# Patient Record
Sex: MALE | Race: WHITE
[De-identification: names, ages, dates, MRNs, and addresses within clinical notes are randomized per-mention and may not be internally consistent; named-entity substitution may affect disease eponyms.]

---

## 2017-01-13 ENCOUNTER — HOSPITAL ENCOUNTER (OUTPATIENT)
Dept: HOSPITAL 80 - FIMAGING | Age: 77
End: 2017-01-13
Attending: PSYCHIATRY & NEUROLOGY
Payer: COMMERCIAL

## 2017-01-13 DIAGNOSIS — M43.06: Primary | ICD-10-CM

## 2017-01-13 DIAGNOSIS — M48.06: ICD-10-CM

## 2017-01-13 DIAGNOSIS — G95.89: ICD-10-CM

## 2017-01-13 DIAGNOSIS — M48.07: ICD-10-CM

## 2017-01-13 DIAGNOSIS — M43.17: ICD-10-CM

## 2017-01-13 NOTE — MR
MRI of the Lumbar Spine (Without Contrast)

 

Clinical Indications: Right lower extremity weakness.

 

Technique: Sagittal and axial T1 and T2 MR sequences of the lumbar spine without contrast.

 

Findings:

T12-L1: Broad-based annular bulging contributes to moderate acquired central canal stenosis.

 

L1-L2: Broad-based annular bulging ventrally contributes to severe acquired central canal stenosis at
 this level. There is marked crowding of the cauda equina and thecal space. Focal epidural fat hypert
rophy is present posteriorly. Neural foramina are moderately narrowed bilaterally.

 

L2-L3: Broad-based annular bulging ventrally contributes to severe acquired central canal stenosis. T
here is ligamentum flavum and facet degenerative hypertrophy posteriorly. Tethering of the cauda equi
na within the thecal space is identified. The foramina are mildly narrowed bilaterally.

 

L3-L4: Broad-based annular bulging centrally contributes to severe acquired central canal stenosis, w
ith marked crowding of the cauda equina. The neural foramina are moderately stenotic bilaterally, lef
t greater the right.

 

L4-L5: Disk desiccation and broad-based annular bulging, with moderate acquired central canal stenosi
s at this level. Neural foramina appear moderately narrowed bilaterally, left greater than right, sec
ondary to annular bulging.

 

L5-S1: Bilateral spondylolysis with 8 mm anterolisthesis of L5 upon S1. The central canal is decompre
ssed. However, the neural foramina appear severely stenotic bilaterally, right greater than left, sec
ondary to spondylolisthesis and annular bulging. The patient has a right L5 radiculopathy, this would
 explain this clinical abnormality.

 

Impression:

1. 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
l foraminal stenosis.

2. Severe acquired central canal stenosis at L1-L2, L2-L3, and L3-L4. There is moderate acquired cent
ral canal stenosis at T12-L1 and L4-L5. Tethering and crowding of the cauda equina in the thecal spac
e is present from L1-L2 through L3-L4.

## 2017-02-15 ENCOUNTER — HOSPITAL ENCOUNTER (OUTPATIENT)
Dept: HOSPITAL 80 - BMCIMAGING | Age: 77
End: 2017-02-15
Attending: NEUROLOGICAL SURGERY
Payer: COMMERCIAL

## 2017-02-15 DIAGNOSIS — M51.36: Primary | ICD-10-CM

## 2017-02-15 DIAGNOSIS — M43.16: ICD-10-CM

## 2017-09-12 ENCOUNTER — HOSPITAL ENCOUNTER (OUTPATIENT)
Dept: HOSPITAL 80 - FIMAGING | Age: 77
End: 2017-09-12
Attending: INTERNAL MEDICINE
Payer: COMMERCIAL

## 2017-09-12 DIAGNOSIS — R91.8: Primary | ICD-10-CM

## 2017-09-12 DIAGNOSIS — I25.10: ICD-10-CM

## 2017-09-12 DIAGNOSIS — D35.02: ICD-10-CM

## 2017-09-12 DIAGNOSIS — D35.01: ICD-10-CM

## 2017-09-12 DIAGNOSIS — J44.9: ICD-10-CM

## 2018-05-13 ENCOUNTER — HOSPITAL ENCOUNTER (EMERGENCY)
Dept: HOSPITAL 80 - FED | Age: 78
Discharge: HOME | End: 2018-05-13
Payer: COMMERCIAL

## 2018-05-13 VITALS — DIASTOLIC BLOOD PRESSURE: 79 MMHG | SYSTOLIC BLOOD PRESSURE: 124 MMHG

## 2018-05-13 DIAGNOSIS — Z79.82: ICD-10-CM

## 2018-05-13 DIAGNOSIS — Z87.891: ICD-10-CM
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
## 2018-05-13 DIAGNOSIS — I10: ICD-10-CM

## 2018-05-13 DIAGNOSIS — J44.9: ICD-10-CM
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
## 2018-05-13 DIAGNOSIS — S22.32XA: Primary | ICD-10-CM

## 2018-05-13 NOTE — EDPHY
H & P


Stated Complaint: Fall, L side rib pain


Time Seen by Provider: 05/13/18 05:58


HPI/ROS: 





HPI





CHIEF COMPLAINT:  Left lateral rib pain status post mechanical trip and fall.





HISTORY OF PRESENT ILLNESS:  Patient is a very pleasant 77-year-old male, 

presents emergency room with left lateral rib pain.  Patient states that 3 o'

clock yesterday he tripped over a shoe in the laundry room any landed on the 

edge of the wash Tylenol on left lateral ribs.  He now has left lateral rib 

pain.  Worse when he takes deep breath in.  Also distally worse when you press 

on his left lateral ribs.  Denies any hemoptysis.  Denies shortness of breath, 

denies significant pleuritic pain.  Pain is worse when you press on the left 

lateral ribs over the axilla region or high ribs.  No flail chest.  No crepitus 

on exam.  No ecchymosis.  Patient is not on any anticoagulation.  He denies any 

other areas of trauma.  Decided come the emergency room due to ongoing pain.








Past Medical History:  Hypertension





Past Surgical History:  Prostatectomy, hernia repair, eye surgery





Social History:  Occasional alcohol use.  Denies tobacco or drugs.





Family History:  Noncontributory








ROS   


REVIEW OF SYSTEMS:


A comprehensive 10 point review of systems is otherwise negative aside from 

elements mentioned in the history of present illness.








Exam   


Constitutional appears well nontoxic no acute distress  triage nursing summary 

reviewed, vital signs reviewed, awake/alert. 


Eyes   normal conjunctivae and sclera, EOMI, PERRLA. 


HENT   normal inspection, atraumatic, moist mucus membranes, no epistaxis, neck 

supple/ no meningismus, no raccoon eyes. 


Respiratory   clear to auscultation bilaterally, normal breath sounds, no 

respiratory distress, no wheezing. 


Cardiovascular  chest wall:  Mild tenderness palpation over the left lateral 

upper chest wall.  No crepitus.  No flail chest.  No ecchymosis.  No 

subcutaneous emphysema on exam, rate normal, regular rhythm, no murmur, no edema

, distal pulses normal. 


Gastrointestinal   soft, non-tender, no rebound, no guarding, normal bowel 

sounds, no distension, no pulsatile mass. 


Genitourinary   no CVA tenderness. 


Musculoskeletal  no midline vertebral tenderness, full range of motion, no calf 

swelling, no tenderness of extremities, no meningismus, good pulses, 

neurovascularly intact.


Skin   pink, warm, & dry, no rash, skin atraumatic. 


Neurologic   awake, alert and oriented x 3, AAOx3, moves all 4 extremities 

equally, motor intact, sensory intact, CN II-XII intact, normal cerebellar, 

normal vision, normal speech. 


Psychiatric   normal mood/affect. 


Heme/Lymph/Immune   no lymphadenopathy.





Differential Diagnosis:  Includes but is not limited to in a particular order 

rib contusions, rib bruising, rib fracture, musculoskeletal strain, pneumothorax

, pulmonary contusion





Medical Decision Making:  Plan for this patient Norco for pain control.  Chest x

-ray with rib series.  Re-evaluate.  Incentive spirometer.





Re-evaluation:











ED x-ray chest with rib series shows no evidence of pneumothorax or significant 

rib fractures.  Image interpreted by myself.








Clinical exam is possible is patient has rib fractures however not evident on 

the x-ray.  Will placed on Norco for pain control.  Additionally incentive 

spirometer.





I do not see a pneumothorax on his chest x-ray.





Recommend he ice his chest, anti-inflammatory pain medicine for mild pain, 

Norco for severe pain.





Incentive spirometer.





Return precautions discussed the me understands return emergency room if 

develops worsening chest wall pain, shortness of breath or questions or 

concerns.


Source: Patient





- Personal History


Current Tetanus Diphtheria and Acellular Pertussis (TDAP): Yes





- Medical/Surgical History


Hx Asthma: No


Hx Chronic Respiratory Disease: Yes


Hx Diabetes: No


Hx Cardiac Disease: No


Hx Renal Disease: No


Hx Cirrhosis: No


Hx Alcoholism: No


Hx HIV/AIDS: No


Hx Splenectomy or Spleen Trauma: No


Other PMH: COPD





- Social History


Smoking Status: Former smoker


Constitutional: 


 Initial Vital Signs











Temperature (C)  36.4 C   05/13/18 05:41


 


Heart Rate  87   05/13/18 05:41


 


Respiratory Rate  18   05/13/18 05:41


 


Blood Pressure  130/85 H  05/13/18 05:41


 


O2 Sat (%)  94   05/13/18 05:41








 











O2 Delivery Mode               Room Air














Allergies/Adverse Reactions: 


 





No Allergies [NKDA] Allergy (Verified 05/13/18 05:40)


 








Home Medications: 














 Medication  Instructions  Recorded


 


Advil  05/19/16


 


Aspirin 81mg (OTC)  05/19/16


 


CALCIUM  05/19/16


 


Losartan Potassium  05/19/16


 


Multivitamin  05/19/16


 


Hydrocodone/APAP 5/325 [Norco 1 - 2 tab PO Q4H PRN #10 tab 05/13/18





5/325]  














Medical Decision Making





- Data Points


Medications Given: 


 








Discontinued Medications





Hydrocodone Bitart/Acetaminophen (Norco 5/325mg Prepack#6)  1 btl TAKEHOME 

EDNOW ONE


   Stop: 05/13/18 05:59


   Last Admin: 05/13/18 06:04 Dose:  1 btl


Hydrocodone Bitart/Acetaminophen (Norco 5/325)  1 tab PO EDNOW ONE


   Stop: 05/13/18 05:59


   Last Admin: 05/13/18 06:04 Dose:  1 tab








Departure





- Departure


Disposition: Home, Routine, Self-Care


Clinical Impression: 


Rib fractures


Qualifiers:


 Encounter type: initial encounter Rib fracture type: single rib Fracture type: 

closed Laterality: left Qualified Code(s): S22.32XA - Fracture of one rib, left 

side, initial encounter for closed fracture





Condition: Good


Instructions:  Hydrocodone/Acetaminophen (By mouth), Rib Fracture (ED)


Additional Instructions: 


1. Use your incentive spirometer as prescribed.


2. Norco few having severe pain.


3. Recommend Tylenol or Motrin for mild pain.


4. Return emergency room if you have worsening chest pain, shortness of breath, 

fever, vomiting.


Referrals: 


Tatiana Trejo PA [Primary Care Provider] - As per Instructions


Prescriptions: 


Hydrocodone/APAP 5/325 [Norco 5/325] 1 - 2 tab PO Q4H PRN #10 tab


 PRN Reason: Pain, Moderate

## 2018-05-22 ENCOUNTER — HOSPITAL ENCOUNTER (OUTPATIENT)
Dept: HOSPITAL 80 - FIMAGING | Age: 78
End: 2018-05-22
Attending: PHYSICIAN ASSISTANT
Payer: COMMERCIAL

## 2018-05-22 DIAGNOSIS — Z13.820: Primary | ICD-10-CM

## 2018-05-22 DIAGNOSIS — M85.89: ICD-10-CM

## 2018-05-22 DIAGNOSIS — Z82.62: ICD-10-CM

## 2018-08-31 ENCOUNTER — HOSPITAL ENCOUNTER (OUTPATIENT)
Dept: HOSPITAL 80 - FIMAGING | Age: 78
End: 2018-08-31
Attending: FAMILY MEDICINE
Payer: COMMERCIAL

## 2018-08-31 DIAGNOSIS — J44.9: Primary | ICD-10-CM

## 2019-01-07 ENCOUNTER — HOSPITAL ENCOUNTER (OUTPATIENT)
Dept: HOSPITAL 80 - FIMAGING | Age: 79
Discharge: HOME | End: 2019-01-07
Attending: PHYSICAL MEDICINE & REHABILITATION
Payer: COMMERCIAL

## 2019-01-07 DIAGNOSIS — M54.16: Primary | ICD-10-CM

## 2019-01-14 ENCOUNTER — HOSPITAL ENCOUNTER (OUTPATIENT)
Dept: HOSPITAL 80 - BMCIMAGING | Age: 79
End: 2019-01-14
Attending: PHYSICIAN ASSISTANT
Payer: COMMERCIAL

## 2019-01-14 DIAGNOSIS — J44.9: ICD-10-CM

## 2019-01-14 DIAGNOSIS — R05: Primary | ICD-10-CM
